# Patient Record
Sex: MALE | NOT HISPANIC OR LATINO
[De-identification: names, ages, dates, MRNs, and addresses within clinical notes are randomized per-mention and may not be internally consistent; named-entity substitution may affect disease eponyms.]

---

## 2019-12-06 ENCOUNTER — APPOINTMENT (OUTPATIENT)
Dept: UROLOGY | Facility: CLINIC | Age: 84
End: 2019-12-06
Payer: MEDICARE

## 2019-12-06 VITALS
SYSTOLIC BLOOD PRESSURE: 103 MMHG | TEMPERATURE: 98.1 F | DIASTOLIC BLOOD PRESSURE: 57 MMHG | HEART RATE: 60 BPM | HEIGHT: 67 IN | BODY MASS INDEX: 25.43 KG/M2 | RESPIRATION RATE: 15 BRPM | WEIGHT: 162 LBS

## 2019-12-06 DIAGNOSIS — Z80.3 FAMILY HISTORY OF MALIGNANT NEOPLASM OF BREAST: ICD-10-CM

## 2019-12-06 DIAGNOSIS — Z86.79 PERSONAL HISTORY OF OTHER DISEASES OF THE CIRCULATORY SYSTEM: ICD-10-CM

## 2019-12-06 DIAGNOSIS — Z72.89 OTHER PROBLEMS RELATED TO LIFESTYLE: ICD-10-CM

## 2019-12-06 PROCEDURE — 99203 OFFICE O/P NEW LOW 30 MIN: CPT

## 2019-12-06 NOTE — REVIEW OF SYSTEMS
[Seen by urologist before (Name)  ___] : Preciously seen by a urologist: [unfilled] [Poor quality erections] : Poor quality erections [Wake up at night to urinate  How many times?  ___] : wakes up to urinate [unfilled] times during the night [Negative] : Endocrine

## 2019-12-11 NOTE — PHYSICAL EXAM
[General Appearance - Well Nourished] : well nourished [General Appearance - Well Developed] : well developed [Normal Appearance] : normal appearance [Well Groomed] : well groomed [Edema] : no peripheral edema [General Appearance - In No Acute Distress] : no acute distress [Respiration, Rhythm And Depth] : normal respiratory rhythm and effort [Exaggerated Use Of Accessory Muscles For Inspiration] : no accessory muscle use [Abdomen Tenderness] : non-tender [Abdomen Soft] : soft [Costovertebral Angle Tenderness] : no ~M costovertebral angle tenderness [Urethral Meatus] : meatus normal [Penis Abnormality] : normal circumcised penis [Scrotum] : the scrotum was normal [Urinary Bladder Findings] : the bladder was normal on palpation [Testes Mass (___cm)] : there were no testicular masses [Rectal Exam - Rectum] : digital rectal exam was normal [No Prostate Nodules] : no prostate nodules [Prostate Size ___ (0-4)] : prostate size [unfilled] (scale: 0-4) [Normal Station and Gait] : the gait and station were normal for the patient's age [No Focal Deficits] : no focal deficits [] : no rash [Affect] : the affect was normal [Oriented To Time, Place, And Person] : oriented to person, place, and time [Mood] : the mood was normal [Not Anxious] : not anxious [No Palpable Adenopathy] : no palpable adenopathy [FreeTextEntry1] : right inguinal hernia

## 2019-12-11 NOTE — LETTER BODY
[Courtesy Letter:] : I had the pleasure of seeing your patient, [unfilled], in my office today. [Dear  ___] : Dear  [unfilled], [Please see my note below.] : Please see my note below. [Consult Closing:] : Thank you very much for allowing me to participate in the care of this patient.  If you have any questions, please do not hesitate to contact me. [FreeTextEntry1] : Thank you so much for referring . John Pryor to see me.\par \par As you know, he is an 85 yo male with rising psa after prostate cancer therapy.  Had elevated PSA some years ago, and had biopsy with rachel 7 and underwent XRT ~ 8 years ago ([facility] out on [place]).  Has been following, with now rising PSA level after zofia ~ 0.5 post XRT.  No current voiding symptoms.  Sexually active.  No flank, abdominal, musculoskeletal pain.  No hematuria.\par \par MRI done in 3/2019---[facility] 2.\par \par PSA Hx:\par 2.3--- 9/2019\par 2.1--- 2/2019\par 1.8--- 9/2018\par 1.3--- 7/2017\par 0.94-- 6/2016\par \par Current meds: ramipril, nifedipine, atorvastatin, metoprolol, asa 81 mg\par PMH: CAD, prostate cancer, possible sarcoidosis\par PSH: CABG 1987, hernia, ccy, appy, neural fibroma\par NKDA\par SH: , social ETOH\par FH: no sig  FH (though breast cancer in mother)\par \par His PE is unremarkable today.\par \par We discussed options including hormone rx, f/u with labs, given age and current PSA levels with evidence for biochemical recurrence post radiation. \par \par We spoke about early and delayed hormonal therapy.  Pt asymptomatic at present, prefers to RTC and will Check PSA in approx 3 months (which will be approximately 6 months following most recent psa).  He is not eager to rush into therapy at this time [Sincerely,] : Sincerely,

## 2019-12-11 NOTE — ASSESSMENT
[FreeTextEntry1] : We discussed options including hormone rx, f/u with labs, given age and current PSA levels with evidence for biochemical recurrence post radiation. \par \par We spoke about early and delayed hormonal therapy.  Pt asymptomatic at present, prefers to RTC and will Check PSA in approx 3 months (which will be approximately 6 months following most recent psa).  He is not eager to rush into therapy at this time, and in this I concur. \par \par

## 2019-12-11 NOTE — HISTORY OF PRESENT ILLNESS
[None] : no symptoms [FreeTextEntry1] : Pt is 85 yo male referred by Dr. Tahir Linares.  Had elevated PSA some years ago, and had biopsy with rachel 7 and underwent XRT ~ 8 years ago ([facility] out on [place]).  Has been following, with now rising PSA level after zofia ~ 0.5 post XRT.  No current voiding symptoms.  Sexually active.  No flank, abdominal, musculoskeletal pain.  No hematuria.\par \par MRI done in 3/2019---[facility]ADS 2.\par \par PSA Hx:\par 2.3--- 9/2019\par 2.1--- 2/2019\par 1.8--- 9/2018\par 1.3--- 7/2017\par 0.94-- 6/2016\par \par Current meds: ramipril, nifedipine, atorvastatin, metoprolol, asa 81 mg\par PMH: CAD, prostate cancer, possible sarcoidosis\par PSH: CABG 1987, hernia, ccy, appy, neural fibroma\par NKDA\par SH: , social ETOH\par FH: no sig  FH (though breast cancer in mother)

## 2020-02-07 ENCOUNTER — APPOINTMENT (OUTPATIENT)
Dept: UROLOGY | Facility: CLINIC | Age: 85
End: 2020-02-07
Payer: MEDICARE

## 2020-02-07 VITALS
HEIGHT: 67 IN | BODY MASS INDEX: 25.43 KG/M2 | SYSTOLIC BLOOD PRESSURE: 124 MMHG | TEMPERATURE: 98.1 F | HEART RATE: 46 BPM | DIASTOLIC BLOOD PRESSURE: 76 MMHG | WEIGHT: 162 LBS

## 2020-02-07 PROCEDURE — 99212 OFFICE O/P EST SF 10 MIN: CPT

## 2020-02-10 LAB — PSA SERPL-MCNC: 2.3 NG/ML

## 2020-02-14 NOTE — ASSESSMENT
[FreeTextEntry1] : PSA today; will review with pt, assess for any further psa change\par \par we spoke about observation, early androgen ablation, his age and overall risks\par \par Will adjust plan based on new PSA

## 2020-02-14 NOTE — PHYSICAL EXAM
[General Appearance - Well Nourished] : well nourished [Normal Appearance] : normal appearance [General Appearance - Well Developed] : well developed [Well Groomed] : well groomed [General Appearance - In No Acute Distress] : no acute distress [Edema] : no peripheral edema [] : no respiratory distress [Respiration, Rhythm And Depth] : normal respiratory rhythm and effort [Exaggerated Use Of Accessory Muscles For Inspiration] : no accessory muscle use [Oriented To Time, Place, And Person] : oriented to person, place, and time [Affect] : the affect was normal [Not Anxious] : not anxious [Mood] : the mood was normal [Normal Station and Gait] : the gait and station were normal for the patient's age [No Focal Deficits] : no focal deficits

## 2020-02-14 NOTE — HISTORY OF PRESENT ILLNESS
[None] : no symptoms [FreeTextEntry1] : Pt returns in 4 month f/u:\par \par Pt is 85 yo male referred by Dr. Tahir Linares. Had elevated PSA some years ago, and had biopsy with rachel 7 and underwent XRT ~ 8 years ago ([facility] out on [place]). Has been following, with now rising PSA level after zofia ~ 0.5 post XRT. No current voiding symptoms. Sexually active. No flank, abdominal, musculoskeletal pain. No hematuria.\par \par MRI done in 3/2019---[facility]ADS 2.\par \par PSA Hx:\par 2.3--- 9/2019\par 2.1--- 2/2019\par 1.8--- 9/2018\par 1.3--- 7/2017\par 0.94-- 6/2016\par

## 2020-07-15 ENCOUNTER — APPOINTMENT (OUTPATIENT)
Dept: UROLOGY | Facility: CLINIC | Age: 85
End: 2020-07-15
Payer: MEDICARE

## 2020-07-15 VITALS — TEMPERATURE: 97.6 F

## 2020-07-15 PROCEDURE — 99214 OFFICE O/P EST MOD 30 MIN: CPT

## 2020-07-15 NOTE — PHYSICAL EXAM
[General Appearance - Well Developed] : well developed [Well Groomed] : well groomed [Normal Appearance] : normal appearance [General Appearance - In No Acute Distress] : no acute distress [General Appearance - Well Nourished] : well nourished [Abdomen Tenderness] : non-tender [Abdomen Soft] : soft [Edema] : no peripheral edema [Costovertebral Angle Tenderness] : no ~M costovertebral angle tenderness [Respiration, Rhythm And Depth] : normal respiratory rhythm and effort [Exaggerated Use Of Accessory Muscles For Inspiration] : no accessory muscle use [Oriented To Time, Place, And Person] : oriented to person, place, and time [Affect] : the affect was normal [Mood] : the mood was normal [Not Anxious] : not anxious [Normal Station and Gait] : the gait and station were normal for the patient's age [No Focal Deficits] : no focal deficits [Penis Abnormality] : normal circumcised penis [Urethral Meatus] : meatus normal [Epididymis] : the epididymides were normal [Testes Mass (___cm)] : there were no testicular masses [Urinary Bladder Findings] : the bladder was normal on palpation [Scrotum] : the scrotum was normal [No Prostate Nodules] : no prostate nodules [Rectal Exam - Rectum] : digital rectal exam was normal [Prostate Tenderness] : the prostate was not tender [Prostate Size ___ (0-4)] : prostate size [unfilled] (scale: 0-4) [] : no rash [FreeTextEntry1] : right soft inguinal hernia

## 2020-07-15 NOTE — HISTORY OF PRESENT ILLNESS
[None] : no symptoms [FreeTextEntry1] : Pt returns in 4 month f/u:\par \par Pt is now 84 yo male referred by Dr. Tahir Linares. Had elevated PSA some years ago, and had biopsy with rachel 7 and underwent XRT ~ 8 years ago ([facility] out on [place]). Has been following, with now rising PSA level after zofia ~ 0.5 post XRT. No current voiding symptoms. Sexually active. No flank, abdominal, musculoskeletal pain. No hematuria.\par \par MRI done in 3/2019---[facility] 2.\par \par PSA Hx:\par 2.7---7/2020\par 2.3---2/2020\par 2.3--- 9/2019\par 2.1--- 2/2019\par 1.8--- 9/2018\par 1.3--- 7/2017\par 0.94-- 6/2016\par

## 2020-07-15 NOTE — LETTER BODY
[Dear  ___] : Dear  [unfilled], [Courtesy Letter:] : I had the pleasure of seeing your patient, [unfilled], in my office today. [Consult Closing:] : Thank you very much for allowing me to participate in the care of this patient.  If you have any questions, please do not hesitate to contact me. [Please see my note below.] : Please see my note below. [Sincerely,] : Sincerely, [FreeTextEntry1] : Pt returns in 4 month f/u:\par \par Pt is now 84 yo male referred by Dr. Tahir Linares. Had elevated PSA some years ago, and had biopsy with rachel 7 and underwent XRT ~ 8 years ago ([facility] out on [place]). Has been following, with now rising PSA level after zofia ~ 0.5 post XRT. No current voiding symptoms. Sexually active. No flank, abdominal, musculoskeletal pain. No hematuria.\par \par MRI done in 3/2019---[facility]ADS 2.\par \par PSA Hx:\par 2.7---7/2020\par 2.3---2/2020\par 2.3--- 9/2019\par 2.1--- 2/2019\par 1.8--- 9/2018\par 1.3--- 7/2017\par 0.94-- 6/2016\par \par PE unremarkable today. Prostate small, flat, no nodules or induration.\par \par \par PSA reviewed with pt, no sig change, though slightly higher.  Will observe.\par \par we spoke about observation, early androgen ablation, his age and overall risks\par \par Will adjust plan based on new PSA-- > would observe given age 85, psa change is not drastic, no sx, and main potential treatment of early hormonal therapy can be instituted at any time, though may cause sx.\par \par RTC 4 months with psa.

## 2020-07-15 NOTE — ASSESSMENT
[FreeTextEntry1] : PSA reviewed with pt, no sig change, though slightly higher.  Will observe.\par \par we spoke about observation, early androgen ablation, his age and overall risks\par \par Will adjust plan based on new PSA-- > would observe given age 85, psa change is not drastic, no sx, and main potential treatment of early hormonal therapy can be instituted at any time, though may cause sx.\par \par RTC 4 months with psa.

## 2020-11-18 ENCOUNTER — APPOINTMENT (OUTPATIENT)
Dept: UROLOGY | Facility: CLINIC | Age: 85
End: 2020-11-18
Payer: MEDICARE

## 2020-11-18 VITALS — TEMPERATURE: 96.5 F

## 2020-11-18 PROCEDURE — 99213 OFFICE O/P EST LOW 20 MIN: CPT

## 2020-11-18 NOTE — LETTER BODY
[Dear  ___] : Dear  [unfilled], [Courtesy Letter:] : I had the pleasure of seeing your patient, [unfilled], in my office today. [Please see my note below.] : Please see my note below. [Consult Closing:] : Thank you very much for allowing me to participate in the care of this patient.  If you have any questions, please do not hesitate to contact me. [Sincerely,] : Sincerely, [FreeTextEntry1] : Pt returns in 4 month f/u:\par \par Pt is now 86 yo male referred by Dr. Tahir Linares. Had elevated PSA some years ago, and had biopsy with rachel 7 and underwent XRT ~ 8 years ago ([facility] out on [place]). Has been following, with now rising PSA level after zofia ~ 0.5 post XRT. No current voiding symptoms. Sexually active. No flank, abdominal, musculoskeletal pain. No hematuria.\par \par MRI done in 3/2019---[facility]ADS 2.\par \par PSA Hx:\par 2.71-- 11/2020\par 2.7---7/2020\par 2.3---2/2020\par 2.3--- 9/2019\par 2.1--- 2/2019\par 1.8--- 9/2018\par 1.3--- 7/2017\par 0.94-- 6/2016\par \par PE unremarkable today. Prostate small, flat, no nodules or induration.\par \par \par PSA reviewed with pt, no sig change, though slightly higher.  Will observe.\par \par we spoke about observation, early androgen ablation, his age and overall risks\par \par Will adjust plan based on new PSA-- > would observe given age 85, psa change is not drastic, no sx, and main potential treatment of early hormonal therapy can be instituted at any time, though may cause sx.\par \par RTC 4 months with psa.

## 2020-11-18 NOTE — HISTORY OF PRESENT ILLNESS
[FreeTextEntry1] : Pt returns in 4 month f/u:\par \par Pt is now 86 yo male referred by Dr. Tahir Linares. Had elevated PSA some years ago, and had biopsy with rachel 7 and underwent XRT ~ 8 years ago ([facility] out on [place]). Has been following, with now rising PSA level after zofia ~ 0.5 post XRT. No current voiding symptoms. Sexually active. No flank, abdominal, musculoskeletal pain. No hematuria.\par \par MRI done in 3/2019---[facility]ADS 2.\par \par PSA Hx:\par 2.71-- 11/2020\par 2.7---7/2020\par 2.3---2/2020\par 2.3--- 9/2019\par 2.1--- 2/2019\par 1.8--- 9/2018\par 1.3--- 7/2017\par 0.94-- 6/2016 [None] : no symptoms

## 2020-11-18 NOTE — ASSESSMENT
[FreeTextEntry1] : PSA reviewed with pt, no sig change.  Will observe.\par \par Will adjust plan based on new PSA-- > would observe given age 85, psa change is not drastic, no sx, and main potential treatment of early hormonal therapy can be instituted at any time, though may cause sx.\par \par RTC 6 months with psa.

## 2020-11-18 NOTE — PHYSICAL EXAM
[General Appearance - Well Developed] : well developed [General Appearance - Well Nourished] : well nourished [Normal Appearance] : normal appearance [Well Groomed] : well groomed [General Appearance - In No Acute Distress] : no acute distress [Abdomen Soft] : soft [Abdomen Tenderness] : non-tender [Costovertebral Angle Tenderness] : no ~M costovertebral angle tenderness [FreeTextEntry1] : right soft inguinal hernia [Penis Abnormality] : normal circumcised penis [Urinary Bladder Findings] : the bladder was normal on palpation [Rectal Exam - Rectum] : digital rectal exam was normal [Prostate Tenderness] : the prostate was not tender [No Prostate Nodules] : no prostate nodules [Prostate Size ___ (0-4)] : prostate size [unfilled] (scale: 0-4) [Edema] : no peripheral edema [] : no respiratory distress [Respiration, Rhythm And Depth] : normal respiratory rhythm and effort [Exaggerated Use Of Accessory Muscles For Inspiration] : no accessory muscle use [Oriented To Time, Place, And Person] : oriented to person, place, and time [Affect] : the affect was normal [Mood] : the mood was normal [Not Anxious] : not anxious [Normal Station and Gait] : the gait and station were normal for the patient's age [No Focal Deficits] : no focal deficits

## 2021-05-05 ENCOUNTER — APPOINTMENT (OUTPATIENT)
Dept: UROLOGY | Facility: CLINIC | Age: 86
End: 2021-05-05
Payer: MEDICARE

## 2021-05-05 PROCEDURE — 99213 OFFICE O/P EST LOW 20 MIN: CPT

## 2021-05-06 NOTE — PHYSICAL EXAM
[General Appearance - Well Developed] : well developed [General Appearance - Well Nourished] : well nourished [Normal Appearance] : normal appearance [Well Groomed] : well groomed [General Appearance - In No Acute Distress] : no acute distress [Abdomen Tenderness] : non-tender [Abdomen Soft] : soft [Costovertebral Angle Tenderness] : no ~M costovertebral angle tenderness [Penis Abnormality] : normal circumcised penis [Urinary Bladder Findings] : the bladder was normal on palpation [Rectal Exam - Rectum] : digital rectal exam was normal [Prostate Tenderness] : the prostate was not tender [No Prostate Nodules] : no prostate nodules [Prostate Size ___ (0-4)] : prostate size [unfilled] (scale: 0-4) [Edema] : no peripheral edema [] : no respiratory distress [Respiration, Rhythm And Depth] : normal respiratory rhythm and effort [Exaggerated Use Of Accessory Muscles For Inspiration] : no accessory muscle use [Oriented To Time, Place, And Person] : oriented to person, place, and time [Affect] : the affect was normal [Mood] : the mood was normal [Not Anxious] : not anxious [Normal Station and Gait] : the gait and station were normal for the patient's age [No Focal Deficits] : no focal deficits [FreeTextEntry1] : no sig changes over interval

## 2021-05-06 NOTE — ASSESSMENT
[FreeTextEntry1] : PSA and exam appear stable over past 6 months; will cont to follow. Asymptomatic at this time, feeling well.\par \par Will adjust plan based on new PSA as we follow-- > would observe given age 85, psa change is not drastic, no sx, and main potential treatment of early hormonal therapy can be instituted at any time, though may cause sx.\par \par RTC 6 months with psa, exam

## 2021-05-06 NOTE — LETTER BODY
[Dear  ___] : Dear  [unfilled], [Courtesy Letter:] : I had the pleasure of seeing your patient, [unfilled], in my office today. [Please see my note below.] : Please see my note below. [Consult Closing:] : Thank you very much for allowing me to participate in the care of this patient.  If you have any questions, please do not hesitate to contact me. [Sincerely,] : Sincerely, [FreeTextEntry1] : Pt returns in 6 month f/u:\par \par Pt is now 84 yo male referred by Dr. Tahir Linares. Had elevated PSA some years ago, and had biopsy with rachel 7 and underwent XRT ~ 8-9 years ago ([facility] out on [place]). Has been following, with now rising PSA level after zofia ~ 0.5 post XRT. No current voiding symptoms. Sexually active. No flank, abdominal, musculoskeletal pain. No hematuria.\par \par MRI done in 3/2019---[facility]ADS 2.\par \par PSA Hx:\par 2.78- 4/29/21\par 2.71-- 11/2020\par 2.7---7/2020\par 2.3---2/2020\par 2.3--- 9/2019\par 2.1--- 2/2019\par 1.8--- 9/2018\par 1.3--- 7/2017\par 0.94-- 6/2016\par \par PSA and exam appear stable over past 6 months; will cont to follow. Asymptomatic at this time, feeling well.\par \par Will adjust plan based on new PSA as we follow-- > would observe given age 85, psa change is not drastic, no sx, and main potential treatment of early hormonal therapy can be instituted at any time, though may cause sx.\par \par RTC 6 months with psa, exam

## 2021-11-05 ENCOUNTER — APPOINTMENT (OUTPATIENT)
Dept: UROLOGY | Facility: CLINIC | Age: 86
End: 2021-11-05

## 2021-12-03 ENCOUNTER — APPOINTMENT (OUTPATIENT)
Dept: UROLOGY | Facility: CLINIC | Age: 86
End: 2021-12-03
Payer: MEDICARE

## 2021-12-03 PROCEDURE — 99214 OFFICE O/P EST MOD 30 MIN: CPT

## 2021-12-03 NOTE — ASSESSMENT
[FreeTextEntry1] : PSA and exam appear stable over past 6 months; will cont to follow. Asymptomatic at this time, feeling well.\par \par Will adjust plan based on new PSA as we follow-- > would observe given age 86, psa change is not drastic, no sx, and main potential treatment of early hormonal therapy can be instituted at any time, though may cause sx.\par \par Pt did some weeks ago at [place], per his report, but I don't yet have results- will call. Pt reports "it might be 3.9 now"\par \par Options regarding further observation vs. hormonal therapy reviewed at length, including all risks androgen blockade.\par \par RTC 6 months with psa, exam\par will also recheck PSA today for consistency in lab, and to recheck for any spurious rise.\par \par "[place] study 2018 about hormone therapy, prostate cancer specific mortality better on early ADT with long psa doubling time and psa < 12 compared with psa > 12- reviewed with pt and wife at length*

## 2021-12-03 NOTE — HISTORY OF PRESENT ILLNESS
[None] : no symptoms [FreeTextEntry1] : Pt returns in 6 month f/u:\par \par Pt is now 87 yo male referred by Dr. Tahir Linares. Had elevated PSA some years ago, and had biopsy with rachel 7 and underwent XRT ~ 8 years ago ([facility] out on [place]). Has been following, with now rising PSA level after zofia ~ 0.5 post XRT. No current voiding symptoms. Sexually active. No flank, abdominal, musculoskeletal pain. No hematuria.\par \par MRI done in 3/2019---[facility]ADS 2.\par \par PSA Hx:\par 2.78- 4/29/21\par 2.71-- 11/2020\par 2.7---7/2020\par 2.3---2/2020\par 2.3--- 9/2019\par 2.1--- 2/2019\par 1.8--- 9/2018\par 1.3--- 7/2017\par 0.94-- 6/2016

## 2021-12-03 NOTE — LETTER BODY
[Dear  ___] : Dear  [unfilled], [Courtesy Letter:] : I had the pleasure of seeing your patient, [unfilled], in my office today. [Please see my note below.] : Please see my note below. [Consult Closing:] : Thank you very much for allowing me to participate in the care of this patient.  If you have any questions, please do not hesitate to contact me. [Sincerely,] : Sincerely, [FreeTextEntry1] : Pt returns in 6 month f/u:\par \par Pt is now 87 yo male referred by Dr. Tahir Linares. Had elevated PSA some years ago, and had biopsy with rachel 7 and underwent XRT ~ 8 years ago ([facility] out on [place]). Has been following, with now rising PSA level after zofia ~ 0.5 post XRT. No current voiding symptoms. Sexually active. No flank, abdominal, musculoskeletal pain. No hematuria.\par \par MRI done in 3/2019---[facility]ADS 2.\par \par PSA Hx:\par 2.78- 4/29/21\par 2.71-- 11/2020\par 2.7---7/2020\par 2.3---2/2020\par 2.3--- 9/2019\par 2.1--- 2/2019\par 1.8--- 9/2018\par 1.3--- 7/2017\par 0.94-- 6/2016\par \par PE unremarkable for change.\par \par PSA and exam appear stable over past 6 months; will cont to follow. Asymptomatic at this time, feeling well.\par \par Will adjust plan based on new PSA as we follow-- > would observe given age 86, psa change is not drastic, no sx, and main potential treatment of early hormonal therapy can be instituted at any time, though may cause sx.\par \par Pt did some weeks ago at [place], per his report, but I don't yet have results- will call. Pt reports "it might be 3.9 now"\par \par Options regarding further observation vs. hormonal therapy reviewed at length, including all risks androgen blockade.\par \par RTC 6 months with psa, exam\par will also recheck PSA today for consistency in lab, and to recheck for any spurious rise.\par \par "[place] study 2018 about hormone therapy, prostate cancer specific mortality better on early ADT with long psa doubling time and psa < 12 compared with psa > 12- reviewed with pt and wife at length*

## 2021-12-03 NOTE — PHYSICAL EXAM
[General Appearance - Well Developed] : well developed [General Appearance - Well Nourished] : well nourished [Normal Appearance] : normal appearance [Well Groomed] : well groomed [General Appearance - In No Acute Distress] : no acute distress [Abdomen Soft] : soft [Abdomen Tenderness] : non-tender [Costovertebral Angle Tenderness] : no ~M costovertebral angle tenderness [Penis Abnormality] : normal circumcised penis [Urinary Bladder Findings] : the bladder was normal on palpation [Rectal Exam - Rectum] : digital rectal exam was normal [Prostate Tenderness] : the prostate was not tender [No Prostate Nodules] : no prostate nodules [Prostate Size ___ (0-4)] : prostate size [unfilled] (scale: 0-4) [Edema] : no peripheral edema [] : no respiratory distress [Respiration, Rhythm And Depth] : normal respiratory rhythm and effort [Exaggerated Use Of Accessory Muscles For Inspiration] : no accessory muscle use [Oriented To Time, Place, And Person] : oriented to person, place, and time [Affect] : the affect was normal [Mood] : the mood was normal [Not Anxious] : not anxious [Normal Station and Gait] : the gait and station were normal for the patient's age [No Focal Deficits] : no focal deficits [FreeTextEntry1] : no sig changes over interval

## 2021-12-07 LAB — PSA SERPL-MCNC: 3.47 NG/ML

## 2021-12-09 ENCOUNTER — APPOINTMENT (OUTPATIENT)
Dept: UROLOGY | Facility: CLINIC | Age: 86
End: 2021-12-09
Payer: MEDICARE

## 2021-12-09 PROCEDURE — 99441: CPT | Mod: 95

## 2021-12-09 NOTE — HISTORY OF PRESENT ILLNESS
[Home] : at home, [unfilled] , at the time of the visit. [Other Location: e.g. Home (Enter Location, City,State)___] : at [unfilled] [Verbal consent obtained from patient] : the patient, [unfilled] [FreeTextEntry1] : The patient-doctor relationship has been established in a face to face fashion via real time video/audio HIPAA compliant communication using telemedicine software. The patient's identity has been confirmed. The patient was previously emailed a copy of the telemedicine consent. They have had a chance to review and has now given verbal consent and has requested care to be assessed and treated via telemedicine. They understand there may be limitations in this process, and that they may need further followup care in the office and/or hospital settings.\par \par Verbal consent given on Dec  9 2021  4:00PM\par \par ANA CANADA is a 86 year M who presents for phone f/u of repeat psa Notes 12/3/21 reviewed- now 85 yo male referred by Dr. Tahir Linares. Had elevated PSA some years ago, and had biopsy with rachel 7 and underwent XRT ~ 8 years ago ([facility] out on [place]). Has been following, with now rising PSA level after zofia ~ 0.5 post XRT. No current voiding symptoms. Not sexually active at this time. No flank, abdominal, musculoskeletal pain. No hematuria.\par \par MRI done in 3/2019---PIRADS 2.\par \par PSA Hx:\par 3.47-- 12/3/21\par 3.91-- 11/16/21\par 2.78- 4/29/21\par 2.71-- 11/2020\par 2.7---7/2020\par 2.3---2/2020\par 2.3--- 9/2019\par 2.1--- 2/2019\par 1.8--- 9/2018\par 1.3--- 7/2017\par 0.94-- 6/2016 \par \par \par \par \par \par 12/09/2021 \par \par The patient denies fevers, chills, nausea and/or vomiting, and no unexplained weight loss.\par \par All pertinent parts of the patient PFSH (past medical, family, and social histories), laboratory, radiological studies and available physician notes were reviewed prior to starting the face-to-face portion of the telemedicine visit. Questionnaire results, where appropriate, were discussed with the patient.\par  [None] : no symptoms

## 2021-12-09 NOTE — ASSESSMENT
[FreeTextEntry1] : LIkely continued rise of psa\par \par Considering back over the past years, appears doubling time for him is approx 3 years, given 1.8 in 9/2018 and 3.47 now, 1.3 in 2017 and 2.7 in 2020.\par \par We spoke today about early vs. late hormonal therapy, risks of observing vs treatment\par \par Pt agrees to observe at this time\par RTC in 6 months with repeat exam and psa as planned.

## 2022-05-20 ENCOUNTER — APPOINTMENT (OUTPATIENT)
Dept: UROLOGY | Facility: CLINIC | Age: 87
End: 2022-05-20
Payer: MEDICARE

## 2022-05-20 DIAGNOSIS — Z00.00 ENCOUNTER FOR GENERAL ADULT MEDICAL EXAMINATION W/OUT ABNORMAL FINDINGS: ICD-10-CM

## 2022-05-20 PROCEDURE — 99214 OFFICE O/P EST MOD 30 MIN: CPT

## 2022-05-20 NOTE — HISTORY OF PRESENT ILLNESS
[FreeTextEntry1] :  86 year M who presents for phone f/u of repeat psa Notes 12/3/21 reviewed- now 85 yo male referred by Dr. Tahir Linares. Had elevated PSA some years ago, and had biopsy with rachel 7 and underwent XRT ~ 8 years ago ([facility] out on [place]). Has been following, with now rising PSA level after zofia ~ 0.5 post XRT. No current voiding symptoms. Not sexually active at this time. No flank, abdominal, musculoskeletal pain. No hematuria.\par \par MRI done in 3/2019---PIRADS 2.\par \par PSA Hx:\par 3.47-- 12/3/21\par 3.91-- 11/16/21\par 2.78- 4/29/21\par 2.71-- 11/2020\par 2.7---7/2020\par 2.3---2/2020\par 2.3--- 9/2019\par 2.1--- 2/2019\par 1.8--- 9/2018\par 1.3--- 7/2017\par 0.94-- 6/2016 \par \par \par \par  [None] : no symptoms [Dysuria] : no dysuria [Hematuria - Gross] : no gross hematuria

## 2022-05-20 NOTE — PHYSICAL EXAM
[General Appearance - Well Developed] : well developed [General Appearance - Well Nourished] : well nourished [Normal Appearance] : normal appearance [Well Groomed] : well groomed [General Appearance - In No Acute Distress] : no acute distress [Abdomen Soft] : soft [Abdomen Tenderness] : non-tender [Costovertebral Angle Tenderness] : no ~M costovertebral angle tenderness [FreeTextEntry1] : reducible right ing hernia, nontender [Urethral Meatus] : meatus normal [Penis Abnormality] : normal circumcised penis [Urinary Bladder Findings] : the bladder was normal on palpation [Scrotum] : the scrotum was normal [Testes Mass (___cm)] : there were no testicular masses [Rectal Exam - Rectum] : digital rectal exam was normal [No Prostate Nodules] : no prostate nodules [Prostate Size ___ (0-4)] : prostate size [unfilled] (scale: 0-4) [Edema] : no peripheral edema [] : no respiratory distress [Respiration, Rhythm And Depth] : normal respiratory rhythm and effort [Exaggerated Use Of Accessory Muscles For Inspiration] : no accessory muscle use [Oriented To Time, Place, And Person] : oriented to person, place, and time [Affect] : the affect was normal [Mood] : the mood was normal [Not Anxious] : not anxious [Normal Station and Gait] : the gait and station were normal for the patient's age [No Focal Deficits] : no focal deficits [No Palpable Adenopathy] : no palpable adenopathy

## 2022-05-20 NOTE — ASSESSMENT
[FreeTextEntry1] : LIkely continued rise of psa\par \par Considering back over the past years, appears doubling time for him is approx 3 years, given 1.8 in 9/2018 and 3.47 now, 1.3 in 2017 and 2.7 in 2020.\par \par We spoke today about early vs. late hormonal therapy, risks of observing vs treatment\par \par Will check psa today, compare, review by phone or telehealth\par RTC in 6 months with repeat exam and psa as planned.

## 2022-05-23 LAB — PSA SERPL-MCNC: 2.78 NG/ML

## 2022-06-14 ENCOUNTER — APPOINTMENT (OUTPATIENT)
Dept: PHYSICAL MEDICINE AND REHAB | Facility: CLINIC | Age: 87
End: 2022-06-14

## 2022-11-23 ENCOUNTER — APPOINTMENT (OUTPATIENT)
Dept: UROLOGY | Facility: CLINIC | Age: 87
End: 2022-11-23

## 2022-11-23 PROCEDURE — 99214 OFFICE O/P EST MOD 30 MIN: CPT

## 2022-11-23 RX ORDER — ROSUVASTATIN CALCIUM 10 MG/1
10 TABLET, FILM COATED ORAL
Qty: 90 | Refills: 0 | Status: ACTIVE | COMMUNITY
Start: 2022-11-01

## 2022-11-23 RX ORDER — NIFEDIPINE 30 MG/1
30 TABLET, FILM COATED, EXTENDED RELEASE ORAL
Qty: 90 | Refills: 0 | Status: ACTIVE | COMMUNITY
Start: 2022-11-01

## 2022-11-23 RX ORDER — METOPROLOL SUCCINATE 25 MG/1
25 TABLET, EXTENDED RELEASE ORAL
Qty: 90 | Refills: 0 | Status: ACTIVE | COMMUNITY
Start: 2022-11-01

## 2022-11-23 NOTE — ASSESSMENT
[FreeTextEntry1] : No clinical changes.\par Unsure if recent labs done- will check\par \par plan \par 1) PSA today - will review\par 2) RTC 6 months

## 2022-11-23 NOTE — HISTORY OF PRESENT ILLNESS
[None] : None [FreeTextEntry1] : Now 87 year M who presents for phone f/u of repeat psa Notes 12/3/21 reviewed- init referred by Dr. Tahir Linares. Had elevated PSA some years ago, and had biopsy with rachel 7 and underwent XRT ~ 9 years ago ([facility] out on [place]). Has been following, with now rising PSA level after zofia ~ 0.5 post XRT. No current voiding symptoms. Not sexually active at this time. No flank, abdominal, musculoskeletal pain. No hematuria.\par \par MRI done in 3/2019---PIRADS 2.\par \par PSA Hx:\par 2.78-- 5/2022\par 3.47-- 12/3/21\par 3.91-- 11/16/21\par 2.78- 4/29/21\par 2.71-- 11/2020\par 2.7---7/2020\par 2.3---2/2020\par 2.3--- 9/2019\par 2.1--- 2/2019\par 1.8--- 9/2018\par 1.3--- 7/2017\par 0.94-- 6/2016 \par

## 2022-11-24 LAB
PSA SERPL-MCNC: 4.08 NG/ML
PSA SERPL-MCNC: 4.12 NG/ML

## 2022-11-28 ENCOUNTER — NON-APPOINTMENT (OUTPATIENT)
Age: 87
End: 2022-11-28

## 2022-12-16 ENCOUNTER — APPOINTMENT (OUTPATIENT)
Dept: UROLOGY | Facility: CLINIC | Age: 87
End: 2022-12-16

## 2023-05-17 ENCOUNTER — APPOINTMENT (OUTPATIENT)
Dept: UROLOGY | Facility: CLINIC | Age: 88
End: 2023-05-17
Payer: MEDICARE

## 2023-05-17 PROCEDURE — 99214 OFFICE O/P EST MOD 30 MIN: CPT

## 2023-05-17 NOTE — ASSESSMENT
[FreeTextEntry1] : No clinical changes.\par Pt feeling well and denies voiding issues at this time. Due for f/u with psa\par \par Discussed Hormone therapy- risks/benefits, early vs. late, given age, psa slowly increasing since radiation therapy\par \par plan \par 1) PSA today - will review by phone.\par 2) RTC 6 months

## 2023-05-17 NOTE — HISTORY OF PRESENT ILLNESS
[None] : None [FreeTextEntry1] : Now 87 year M who presents for phone f/u of repeat psa Notes 12/3/21 reviewed- init referred by Dr. Tahir Linares. Had elevated PSA some years ago, and had biopsy with rachel 7 and underwent XRT ~ 9 years ago ([facility] out on [place]). Has been following, with now rising PSA level after zofia ~ 0.5 post XRT. No current voiding symptoms. Not sexually active at this time. No flank, abdominal, musculoskeletal pain. No hematuria.\par \par MRI done in 3/2019---PIRADS 2.\par \par PSA Hx:\par 4.08 -- 11/2022\par 2.78-- 5/2022\par 3.47-- 12/3/21\par 3.91-- 11/16/21\par 2.78- 4/29/21\par 2.71-- 11/2020\par 2.7---7/2020\par 2.3---2/2020\par 2.3--- 9/2019\par 2.1--- 2/2019\par 1.8--- 9/2018\par 1.3--- 7/2017\par 0.94-- 6/2016 \par

## 2023-05-17 NOTE — PHYSICAL EXAM
[General Appearance - Well Developed] : well developed [Edema] : no peripheral edema [] : no respiratory distress [Oriented To Time, Place, And Person] : oriented to person, place, and time [Normal Station and Gait] : the gait and station were normal for the patient's age [No Focal Deficits] : no focal deficits [Abdomen Soft] : soft [FreeTextEntry1] : deferred today, after discussion with pt

## 2023-05-19 LAB — PSA SERPL-MCNC: 4.69 NG/ML

## 2023-05-23 ENCOUNTER — APPOINTMENT (OUTPATIENT)
Dept: UROLOGY | Facility: CLINIC | Age: 88
End: 2023-05-23
Payer: MEDICARE

## 2023-05-23 PROCEDURE — 99442: CPT | Mod: 95

## 2023-05-23 NOTE — HISTORY OF PRESENT ILLNESS
[Other Location: e.g. School (Enter Location, City,State)___] : at [unfilled], at the time of the visit. [Other Location: e.g. Home (Enter Location, City,State)___] : at [unfilled] [Verbal consent obtained from patient] : the patient, [unfilled] [FreeTextEntry1] : The patient-doctor relationship has been established in a face to face fashion via real time video/audio HIPAA compliant communication using telemedicine software. The patient's identity has been confirmed. The patient was previously emailed a copy of the telemedicine consent. They have had a chance to review and has now given verbal consent and has requested care to be assessed and treated via telemedicine. They understand there may be limitations in this process, and that they may need further followup care in the office and/or hospital settings.\par \par Verbal consent given on May 23 2023  2:00PM\par \par ANA CANADA is a 87 year M who presents for f/u of repeat psa Notes 12/3/21 reviewed- init referred by Dr. Tahir Linares. Had elevated PSA some years ago, and had biopsy with rachel 7 and underwent XRT ~ 9 years ago ([facility] out on [place]). Has been following, with now rising PSA level after zofia ~ 0.5 post XRT. No current voiding symptoms. Not sexually active at this time. No flank, abdominal, musculoskeletal pain. No hematuria.\par \par MRI done in 3/2019---PIRADS 2.\par Had f/u psa in office last week:\par \par PSA Hx:\par 4.69-- 5/17/23\par 4.08 -- 11/2022\par 2.78-- 5/2022\par 3.47-- 12/3/21\par 3.91-- 11/16/21\par 2.78- 4/29/21\par 2.71-- 11/2020\par 2.7---7/2020\par 2.3---2/2020\par 2.3--- 9/2019\par 2.1--- 2/2019\par 1.8--- 9/2018\par 1.3--- 7/2017\par 0.94-- 6/2016 \par \par \par \par \par 05/23/2023 \par \par The patient denies fevers, chills, nausea and/or vomiting, and no unexplained weight loss.\par \par All pertinent parts of the patient PFSH (past medical, family, and social histories), laboratory, radiological studies and available physician notes were reviewed prior to starting the face-to-face portion of the telemedicine visit. Questionnaire results, where appropriate, were discussed with the patient.\par

## 2023-05-23 NOTE — ASSESSMENT
[FreeTextEntry1] : Doubling time for the psa, and hence prostate cancer post xrt, appears to be approx 3 years.\par \par At that level, pt's age, asymptomatic now, I still recommend observation and surveillance rather than early hormonal therapy which is not without risks (bone health/fracture risk among them).\par \par We had fruitful discussion about starting therapy or continuing to observe- will have pt return in 6 months as planned for next eval unless earlier issues arise.

## 2023-11-10 ENCOUNTER — APPOINTMENT (OUTPATIENT)
Dept: UROLOGY | Facility: CLINIC | Age: 88
End: 2023-11-10
Payer: MEDICARE

## 2023-11-10 VITALS
TEMPERATURE: 97.3 F | DIASTOLIC BLOOD PRESSURE: 69 MMHG | HEART RATE: 59 BPM | RESPIRATION RATE: 17 BRPM | WEIGHT: 162 LBS | BODY MASS INDEX: 25.43 KG/M2 | HEIGHT: 67 IN | SYSTOLIC BLOOD PRESSURE: 133 MMHG

## 2023-11-10 PROCEDURE — 99213 OFFICE O/P EST LOW 20 MIN: CPT

## 2023-11-14 ENCOUNTER — APPOINTMENT (OUTPATIENT)
Dept: UROLOGY | Facility: CLINIC | Age: 88
End: 2023-11-14
Payer: MEDICARE

## 2023-11-14 LAB — PSA SERPL-MCNC: 6.35 NG/ML

## 2023-11-14 PROCEDURE — 99441: CPT | Mod: 95

## 2023-11-19 ENCOUNTER — OUTPATIENT (OUTPATIENT)
Dept: OUTPATIENT SERVICES | Facility: HOSPITAL | Age: 88
LOS: 1 days | Discharge: HOME | End: 2023-11-19

## 2023-11-19 DIAGNOSIS — C61 MALIGNANT NEOPLASM OF PROSTATE: ICD-10-CM

## 2023-11-22 ENCOUNTER — APPOINTMENT (OUTPATIENT)
Dept: HEMATOLOGY ONCOLOGY | Facility: CLINIC | Age: 88
End: 2023-11-22
Payer: MEDICARE

## 2023-11-22 VITALS
TEMPERATURE: 97.4 F | OXYGEN SATURATION: 97 % | SYSTOLIC BLOOD PRESSURE: 129 MMHG | HEART RATE: 56 BPM | DIASTOLIC BLOOD PRESSURE: 66 MMHG | WEIGHT: 164 LBS | HEIGHT: 65.35 IN | RESPIRATION RATE: 16 BRPM | BODY MASS INDEX: 27 KG/M2

## 2023-11-22 DIAGNOSIS — D86.0 SARCOIDOSIS OF LUNG: ICD-10-CM

## 2023-11-22 PROCEDURE — 99205 OFFICE O/P NEW HI 60 MIN: CPT

## 2023-11-22 RX ORDER — RAMIPRIL 5 MG/1
5 CAPSULE ORAL
Refills: 0 | Status: ACTIVE | COMMUNITY

## 2023-12-11 ENCOUNTER — APPOINTMENT (OUTPATIENT)
Dept: HEMATOLOGY ONCOLOGY | Facility: CLINIC | Age: 88
End: 2023-12-11
Payer: MEDICARE

## 2023-12-11 VITALS
TEMPERATURE: 98 F | SYSTOLIC BLOOD PRESSURE: 116 MMHG | WEIGHT: 166.23 LBS | RESPIRATION RATE: 16 BRPM | DIASTOLIC BLOOD PRESSURE: 64 MMHG | OXYGEN SATURATION: 96 % | BODY MASS INDEX: 27.36 KG/M2 | HEART RATE: 57 BPM

## 2023-12-11 PROCEDURE — 99214 OFFICE O/P EST MOD 30 MIN: CPT

## 2023-12-11 RX ORDER — BICALUTAMIDE 50 MG/1
50 TABLET ORAL DAILY
Qty: 90 | Refills: 3 | Status: ACTIVE | COMMUNITY
Start: 2023-12-11 | End: 1900-01-01

## 2023-12-13 NOTE — ASSESSMENT
[FreeTextEntry1] : John Latham is an 88 years old male who was diagnosed rachel 7 in one of 12 cores s/p 50 fractions of radiation at [facility] around 2012. His PSA has been rising to 6.75 in 11/2023 from 0.94 in 6/2016, 2.71 in 11/2020, and 4.69 in 5/2023.   Reviewed his PSMA PET CT in detail. 12/5/23 PSMA PET CT showed increased focal activity noted in the posterior lateral area of the right prostate with SUV 6 and another tiny activity is noted in the left apex with SUV 3.9, the right iliac bone showed a large lucency with low level tracer uptake with SUV 1.5. not any other lesions in the chest, abdomen and pelvis and bones. His right iliac bone showed low activity. It is nonspecific. I will treat him with only ADT with low volume, He was discussed with the benefits and risks of ADT. He will require calcium and vitamin D. Based on the DEXA scan, he will need prolia.  Plan start bicalutamide 50mg daily today start lupron inj in two weeks -will need prolia treatment based on his DEXA scan  start vit D and calcium  RTC in 3 months

## 2023-12-13 NOTE — HISTORY OF PRESENT ILLNESS
[de-identified] : John Latham is an 88 years old male with CABG in the past His PSA was trending up from below 1 to 3.0ng/ml. had a biopsy with rachel 7, one of 12 cores positive,  and underwent 50 fractions XRT around 2012 ([facility] at [place]). Has been following, with now rising PSA level after zofia ~ 0.5 post XRT.   PSA Hx: 6.75- 11/10/23 4.69-- 5/17/23 4.08 -- 11/2022 2.78-- 5/2022 3.47-- 12/3/21 3.91-- 11/16/21 2.78- 4/29/21 2.71-- 11/2020 2.7---7/2020 2.3---2/2020 2.3--- 9/2019 2.1--- 2/2019 1.8--- 9/2018 1.3--- 7/2017 0.94-- 6/2016  Reports feeling great. Urine flow is normal. Nocturia 1. Denies hesitancy, urgency, and gross hematuria.  [de-identified] : prostate cancer  [de-identified] : 12/5/23 PSMA PET CT showed increased focal activity noted in the posterior lateral area of the right prostate with SUV 6 and another tiny activity is noted in the left apex with SUV 3.9, the right iliac bone showed a large lucency with low level tracer uptake with SUV 1.5. not any other lesions in the chest, abdomen and pelvis and bones.   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

## 2023-12-26 ENCOUNTER — APPOINTMENT (OUTPATIENT)
Dept: INFUSION THERAPY | Facility: HOSPITAL | Age: 88
End: 2023-12-26

## 2023-12-27 DIAGNOSIS — Z51.11 ENCOUNTER FOR ANTINEOPLASTIC CHEMOTHERAPY: ICD-10-CM

## 2024-03-12 ENCOUNTER — OUTPATIENT (OUTPATIENT)
Dept: OUTPATIENT SERVICES | Facility: HOSPITAL | Age: 89
LOS: 1 days | Discharge: HOME | End: 2024-03-12

## 2024-03-12 DIAGNOSIS — C61 MALIGNANT NEOPLASM OF PROSTATE: ICD-10-CM

## 2024-03-22 ENCOUNTER — RESULT REVIEW (OUTPATIENT)
Age: 89
End: 2024-03-22

## 2024-03-22 ENCOUNTER — APPOINTMENT (OUTPATIENT)
Dept: INFUSION THERAPY | Facility: HOSPITAL | Age: 89
End: 2024-03-22

## 2024-03-22 ENCOUNTER — APPOINTMENT (OUTPATIENT)
Dept: HEMATOLOGY ONCOLOGY | Facility: CLINIC | Age: 89
End: 2024-03-22
Payer: MEDICARE

## 2024-03-22 VITALS
HEART RATE: 55 BPM | WEIGHT: 166.45 LBS | SYSTOLIC BLOOD PRESSURE: 114 MMHG | DIASTOLIC BLOOD PRESSURE: 64 MMHG | OXYGEN SATURATION: 99 % | TEMPERATURE: 97.3 F | RESPIRATION RATE: 16 BRPM | BODY MASS INDEX: 27.4 KG/M2

## 2024-03-22 LAB
BASOPHILS # BLD AUTO: 0.02 K/UL — SIGNIFICANT CHANGE UP (ref 0–0.2)
BASOPHILS NFR BLD AUTO: 0.2 % — SIGNIFICANT CHANGE UP (ref 0–2)
EOSINOPHIL # BLD AUTO: 0.27 K/UL — SIGNIFICANT CHANGE UP (ref 0–0.5)
EOSINOPHIL NFR BLD AUTO: 3.3 % — SIGNIFICANT CHANGE UP (ref 0–6)
HCT VFR BLD CALC: 36.4 % — LOW (ref 39–50)
HGB BLD-MCNC: 12.7 G/DL — LOW (ref 13–17)
IMM GRANULOCYTES NFR BLD AUTO: 0.1 % — SIGNIFICANT CHANGE UP (ref 0–0.9)
LYMPHOCYTES # BLD AUTO: 1.44 K/UL — SIGNIFICANT CHANGE UP (ref 1–3.3)
LYMPHOCYTES # BLD AUTO: 17.6 % — SIGNIFICANT CHANGE UP (ref 13–44)
MCHC RBC-ENTMCNC: 31.1 PG — SIGNIFICANT CHANGE UP (ref 27–34)
MCHC RBC-ENTMCNC: 34.9 G/DL — SIGNIFICANT CHANGE UP (ref 32–36)
MCV RBC AUTO: 89.2 FL — SIGNIFICANT CHANGE UP (ref 80–100)
MONOCYTES # BLD AUTO: 0.68 K/UL — SIGNIFICANT CHANGE UP (ref 0–0.9)
MONOCYTES NFR BLD AUTO: 8.3 % — SIGNIFICANT CHANGE UP (ref 2–14)
NEUTROPHILS # BLD AUTO: 5.76 K/UL — SIGNIFICANT CHANGE UP (ref 1.8–7.4)
NEUTROPHILS NFR BLD AUTO: 70.5 % — SIGNIFICANT CHANGE UP (ref 43–77)
NRBC # BLD: 0 /100 WBCS — SIGNIFICANT CHANGE UP (ref 0–0)
PLATELET # BLD AUTO: 186 K/UL — SIGNIFICANT CHANGE UP (ref 150–400)
RBC # BLD: 4.08 M/UL — LOW (ref 4.2–5.8)
RBC # FLD: 12.4 % — SIGNIFICANT CHANGE UP (ref 10.3–14.5)
WBC # BLD: 8.18 K/UL — SIGNIFICANT CHANGE UP (ref 3.8–10.5)
WBC # FLD AUTO: 8.18 K/UL — SIGNIFICANT CHANGE UP (ref 3.8–10.5)

## 2024-03-22 PROCEDURE — 99213 OFFICE O/P EST LOW 20 MIN: CPT

## 2024-03-23 NOTE — HISTORY OF PRESENT ILLNESS
[Disease: _____________________] : Disease: [unfilled] [de-identified] : John Latham is an 88 years old male with PMHx of CAD s/p CABG and prostate cancer. He had the initial medical oncology consultation on 11/22/23:  His PSA was trending up from below 1 to 3.0ng/ml. had a biopsy with rachel 7, one of 12 cores positive, and underwent 50 fractions XRT around 2012 ([facility] at [place]). Has been following, with now rising PSA level after zofia ~ 0.5 post XRT.  PSA Hx: 6.75- 11/10/23 4.69-- 5/17/23 4.08 -- 11/2022 2.78-- 5/2022 3.47-- 12/3/21 3.91-- 11/16/21 2.78- 4/29/21 2.71-- 11/2020 2.7---7/2020 2.3---2/2020 2.3--- 9/2019 2.1--- 2/2019 1.8--- 9/2018 1.3--- 7/2017 0.94-- 6/2016  Reports feeling great. Urine flow is normal. Nocturia 1. Denies hesitancy, urgency, and gross hematuria.      Interval History:  12/5/23 PSMA PET CT showed increased focal activity noted in the posterior lateral area of the right prostate with SUV 6 and another tiny activity is noted in the left apex with SUV 3.9, the right iliac bone showed a large lucency with low level tracer uptake with SUV 1.5. not any other lesions in the chest, abdomen and pelvis and bones.  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   [de-identified] : 3/22/24: he feels well and denies fatigue, hot flashes, changes with urination.   [de-identified] : prostate adenocarcinoma

## 2024-03-23 NOTE — ASSESSMENT
[FreeTextEntry1] : Prostate cancer (185) (C61) 88 years old male who was diagnosed rachel 7 in one of 12 cores s/p 50 fractions of radiation at [facility] around 2012. His PSA has been rising to 6.75 in 11/2023 from 0.94 in 6/2016, 2.71 in 11/2020, and 4.69 in 5/2023.  During the consultation in 12/2023, reviewed his PSMA PET CT in detail. 12/5/23 PSMA PET CT showed increased focal activity noted in the posterior lateral area of the right prostate with SUV 6 and another tiny activity is noted in the left apex with SUV 3.9, the right iliac bone showed a large lucency with low level tracer uptake with SUV 1.5. not any other lesions in the chest, abdomen and pelvis and bones. His right iliac bone showed low activity. It is nonspecific. I will treat him with only ADT with low volume, He was discussed with the benefits and risks of ADT. He will require calcium and vitamin D. Based on the DEXA scan, he will need prolia.  Plan -started bicalutamide 50mg daily around 12/11/23; started lupron inj (once every 3 months) on 12/26/23. Will receive today 3/22/24. Next dose around June 2024.  -f/u with lab today 3/22/24 including PSA and testosterone level.  -will need prolia treatment once every 6 months based on his DEXA scan 12/7/23 showing osteoporosis (right femoral neck T score -2.5). He will have dental clearance before starting prolia.  -continue vit D and calcium  RTC in 3 months.

## 2024-03-24 LAB
ALBUMIN SERPL ELPH-MCNC: 4.2 G/DL
ALP BLD-CCNC: 54 U/L
ALT SERPL-CCNC: 25 U/L
ANION GAP SERPL CALC-SCNC: 11 MMOL/L
AST SERPL-CCNC: 21 U/L
BILIRUB SERPL-MCNC: 0.5 MG/DL
BUN SERPL-MCNC: 24 MG/DL
CALCIUM SERPL-MCNC: 9.8 MG/DL
CHLORIDE SERPL-SCNC: 107 MMOL/L
CO2 SERPL-SCNC: 25 MMOL/L
CREAT SERPL-MCNC: 1.02 MG/DL
EGFR: 72 ML/MIN/1.73M2
GLUCOSE SERPL-MCNC: 112 MG/DL
POTASSIUM SERPL-SCNC: 5.6 MMOL/L
PROT SERPL-MCNC: 6.8 G/DL
PSA SERPL-MCNC: 0.09 NG/ML
SODIUM SERPL-SCNC: 142 MMOL/L
TESTOST SERPL-MCNC: <2.5 NG/DL

## 2024-03-25 ENCOUNTER — NON-APPOINTMENT (OUTPATIENT)
Age: 89
End: 2024-03-25

## 2024-03-25 DIAGNOSIS — Z51.11 ENCOUNTER FOR ANTINEOPLASTIC CHEMOTHERAPY: ICD-10-CM

## 2024-04-08 ENCOUNTER — NON-APPOINTMENT (OUTPATIENT)
Age: 89
End: 2024-04-08

## 2024-04-17 ENCOUNTER — APPOINTMENT (OUTPATIENT)
Dept: UROLOGY | Facility: CLINIC | Age: 89
End: 2024-04-17
Payer: MEDICARE

## 2024-04-17 VITALS — DIASTOLIC BLOOD PRESSURE: 80 MMHG | SYSTOLIC BLOOD PRESSURE: 120 MMHG | HEART RATE: 60 BPM

## 2024-04-17 PROCEDURE — 99213 OFFICE O/P EST LOW 20 MIN: CPT

## 2024-04-22 NOTE — PHYSICAL EXAM
[Normal Appearance] : normal appearance [Well Groomed] : well groomed [General Appearance - In No Acute Distress] : no acute distress [Edema] : no peripheral edema [Respiration, Rhythm And Depth] : normal respiratory rhythm and effort [Exaggerated Use Of Accessory Muscles For Inspiration] : no accessory muscle use [Abdomen Tenderness] : non-tender [Normal Station and Gait] : the gait and station were normal for the patient's age [] : no rash [No Focal Deficits] : no focal deficits [Oriented To Time, Place, And Person] : oriented to person, place, and time [Affect] : the affect was normal [Mood] : the mood was normal

## 2024-04-22 NOTE — HISTORY OF PRESENT ILLNESS
[FreeTextEntry1] : 88 year M who presents for f/u psa. Pt is now being treated for prostate ca by Dr. Weiner. Pt is receiving Lupron and bicalutamide 50 mg. Pt complaining of bilateral leg swelling. He saw cardiology and had an echo.   Not sexually active at this time. No flank, abdominal, musculoskeletal pain. No hematuria.  HX init referred by Dr. Tahir Linares. Had elevated PSA some years ago, and had biopsy with rachel 7 and underwent XRT ~ 11 years ago ([facility] out on [place]). Has been following, with now rising PSA level after zofia ~ 0.5 post XRT. No current voiding symptoms.   MRI done in 3/2019---PIRADS 2.  PSA Hx: 0.09- 03/2024 6.75- 11/10/23 4.69-- 5/17/23 4.08 -- 11/2022 2.78-- 5/2022 3.47-- 12/3/21 3.91-- 11/16/21 2.78- 4/29/21 2.71-- 11/2020 2.7---7/2020 2.3---2/2020 2.3--- 9/2019 2.1--- 2/2019 1.8--- 9/2018 1.3--- 7/2017 0.94-- 6/2016  [Currently Experiencing ___] :  [unfilled]

## 2024-04-22 NOTE — ASSESSMENT
[FreeTextEntry1] : d/w pt- of excellent performance status  Follow up with cardiologist for leg swelling.   PSA is responding well and tolerating Lupron/ Bicalutamide   plan  1) Follow up in 6 months and will switch to 6 month juanis at that time Dr. Weiner aware of plans= he will admin lupron 3 months in approx june 2024

## 2024-06-14 ENCOUNTER — RESULT REVIEW (OUTPATIENT)
Age: 89
End: 2024-06-14

## 2024-06-14 ENCOUNTER — APPOINTMENT (OUTPATIENT)
Dept: HEMATOLOGY ONCOLOGY | Facility: CLINIC | Age: 89
End: 2024-06-14
Payer: MEDICARE

## 2024-06-14 ENCOUNTER — APPOINTMENT (OUTPATIENT)
Dept: INFUSION THERAPY | Facility: HOSPITAL | Age: 89
End: 2024-06-14

## 2024-06-14 VITALS
OXYGEN SATURATION: 97 % | HEART RATE: 60 BPM | HEIGHT: 66.14 IN | TEMPERATURE: 97.9 F | SYSTOLIC BLOOD PRESSURE: 107 MMHG | BODY MASS INDEX: 26.82 KG/M2 | DIASTOLIC BLOOD PRESSURE: 67 MMHG | RESPIRATION RATE: 16 BRPM | WEIGHT: 166.89 LBS

## 2024-06-14 DIAGNOSIS — C61 MALIGNANT NEOPLASM OF PROSTATE: ICD-10-CM

## 2024-06-14 PROCEDURE — 99213 OFFICE O/P EST LOW 20 MIN: CPT

## 2024-06-14 PROCEDURE — G2211 COMPLEX E/M VISIT ADD ON: CPT

## 2024-06-14 NOTE — HISTORY OF PRESENT ILLNESS
[Disease: _____________________] : Disease: [unfilled] [de-identified] : John Latham is an 88 years old male with PMHx of CAD s/p CABG and prostate cancer. He had the initial medical oncology consultation on 11/22/23:  His PSA was trending up from below 1 to 3.0ng/ml. had a biopsy with rachel 7, one of 12 cores positive, and underwent 50 fractions XRT around 2012 ([facility] at [place]). Has been following, with now rising PSA level after zofia ~ 0.5 post XRT.  PSA Hx: 6.75- 11/10/23 4.69-- 5/17/23 4.08 -- 11/2022 2.78-- 5/2022 3.47-- 12/3/21 3.91-- 11/16/21 2.78- 4/29/21 2.71-- 11/2020 2.7---7/2020 2.3---2/2020 2.3--- 9/2019 2.1--- 2/2019 1.8--- 9/2018 1.3--- 7/2017 0.94-- 6/2016  Reports feeling great. Urine flow is normal. Nocturia 1. Denies hesitancy, urgency, and gross hematuria.      Interval History:  12/5/23 PSMA PET CT showed increased focal activity noted in the posterior lateral area of the right prostate with SUV 6 and another tiny activity is noted in the left apex with SUV 3.9, the right iliac bone showed a large lucency with low level tracer uptake with SUV 1.5. not any other lesions in the chest, abdomen and pelvis and bones.  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   [de-identified] : prostate adenocarcinoma  [de-identified] : 3/22/24: he feels well and denies fatigue, hot flashes, changes with urination.   6/14/24 feels overall fine, denies fatigue, hot flashes, and sweating. Nocturia 1.

## 2024-06-14 NOTE — ASSESSMENT
[FreeTextEntry1] : Prostate cancer (185) (C61) 88 years old male who was diagnosed rachel 7 in one of 12 cores s/p 50 fractions of radiation at [facility] around 2012. His PSA has been rising to 6.75 in 11/2023 from 0.94 in 6/2016, 2.71 in 11/2020, and 4.69 in 5/2023.  During the consultation in 12/2023, reviewed his PSMA PET CT in detail. 12/5/23 PSMA PET CT showed increased focal activity noted in the posterior lateral area of the right prostate with SUV 6 and another tiny activity is noted in the left apex with SUV 3.9, the right iliac bone showed a large lucency with low level tracer uptake with SUV 1.5. not any other lesions in the chest, abdomen and pelvis and bones. His right iliac bone showed low activity. It is nonspecific. I will treat him with only ADT with low volume, He was discussed with the benefits and risks of ADT. He will require calcium and vitamin D. Based on the DEXA scan with osteoporosis, starts prolia on June 14, 2024   Plan -started bicalutamide 50mg daily around 12/11/23; started lupron inj (once every 3 months) on 12/26/23. Will receive today 3/22/24. Next dose around June 2024.  -f/u with lab today 3/22/24 including PSA and testosterone level.  -start prolia treatment once every 6 months on June 14, 2024  based on his DEXA scan 12/7/23 showing osteoporosis (right femoral neck T score -2.5) -continue vit D and calcium  RTC in 3 months.

## 2024-06-16 LAB
ALBUMIN SERPL ELPH-MCNC: 4.2 G/DL
ALP BLD-CCNC: 60 U/L
ALT SERPL-CCNC: 17 U/L
ANION GAP SERPL CALC-SCNC: 14 MMOL/L
AST SERPL-CCNC: 20 U/L
BILIRUB SERPL-MCNC: 0.3 MG/DL
BUN SERPL-MCNC: 22 MG/DL
CALCIUM SERPL-MCNC: 9.5 MG/DL
CHLORIDE SERPL-SCNC: 104 MMOL/L
CO2 SERPL-SCNC: 22 MMOL/L
CREAT SERPL-MCNC: 1.05 MG/DL
EGFR: 69 ML/MIN/1.73M2
GLUCOSE SERPL-MCNC: 114 MG/DL
POTASSIUM SERPL-SCNC: 4.4 MMOL/L
PROT SERPL-MCNC: 6.9 G/DL
PSA SERPL-MCNC: <0.01 NG/ML
SODIUM SERPL-SCNC: 140 MMOL/L

## 2024-06-17 ENCOUNTER — NON-APPOINTMENT (OUTPATIENT)
Age: 89
End: 2024-06-17

## 2024-09-01 ENCOUNTER — OUTPATIENT (OUTPATIENT)
Dept: OUTPATIENT SERVICES | Facility: HOSPITAL | Age: 89
LOS: 1 days | Discharge: HOME | End: 2024-09-01

## 2024-09-01 DIAGNOSIS — C61 MALIGNANT NEOPLASM OF PROSTATE: ICD-10-CM

## 2024-09-13 ENCOUNTER — RESULT REVIEW (OUTPATIENT)
Age: 89
End: 2024-09-13

## 2024-09-13 ENCOUNTER — APPOINTMENT (OUTPATIENT)
Dept: INFUSION THERAPY | Facility: HOSPITAL | Age: 89
End: 2024-09-13

## 2024-09-13 ENCOUNTER — APPOINTMENT (OUTPATIENT)
Dept: HEMATOLOGY ONCOLOGY | Facility: CLINIC | Age: 89
End: 2024-09-13
Payer: MEDICARE

## 2024-09-13 VITALS
RESPIRATION RATE: 16 BRPM | WEIGHT: 169.73 LBS | DIASTOLIC BLOOD PRESSURE: 60 MMHG | BODY MASS INDEX: 27.28 KG/M2 | TEMPERATURE: 98.6 F | OXYGEN SATURATION: 99 % | SYSTOLIC BLOOD PRESSURE: 104 MMHG | HEART RATE: 64 BPM

## 2024-09-13 DIAGNOSIS — C61 MALIGNANT NEOPLASM OF PROSTATE: ICD-10-CM

## 2024-09-13 LAB
BASOPHILS # BLD AUTO: 0.02 K/UL — SIGNIFICANT CHANGE UP (ref 0–0.2)
BASOPHILS NFR BLD AUTO: 0.3 % — SIGNIFICANT CHANGE UP (ref 0–2)
EOSINOPHIL # BLD AUTO: 0.32 K/UL — SIGNIFICANT CHANGE UP (ref 0–0.5)
EOSINOPHIL NFR BLD AUTO: 5.2 % — SIGNIFICANT CHANGE UP (ref 0–6)
HCT VFR BLD CALC: 35.5 % — LOW (ref 39–50)
HGB BLD-MCNC: 12.4 G/DL — LOW (ref 13–17)
IMM GRANULOCYTES NFR BLD AUTO: 0.2 % — SIGNIFICANT CHANGE UP (ref 0–0.9)
LYMPHOCYTES # BLD AUTO: 1.08 K/UL — SIGNIFICANT CHANGE UP (ref 1–3.3)
LYMPHOCYTES # BLD AUTO: 17.6 % — SIGNIFICANT CHANGE UP (ref 13–44)
MCHC RBC-ENTMCNC: 31.3 PG — SIGNIFICANT CHANGE UP (ref 27–34)
MCHC RBC-ENTMCNC: 34.9 G/DL — SIGNIFICANT CHANGE UP (ref 32–36)
MCV RBC AUTO: 89.6 FL — SIGNIFICANT CHANGE UP (ref 80–100)
MONOCYTES # BLD AUTO: 0.61 K/UL — SIGNIFICANT CHANGE UP (ref 0–0.9)
MONOCYTES NFR BLD AUTO: 9.9 % — SIGNIFICANT CHANGE UP (ref 2–14)
NEUTROPHILS # BLD AUTO: 4.11 K/UL — SIGNIFICANT CHANGE UP (ref 1.8–7.4)
NEUTROPHILS NFR BLD AUTO: 66.8 % — SIGNIFICANT CHANGE UP (ref 43–77)
NRBC # BLD: 0 /100 WBCS — SIGNIFICANT CHANGE UP (ref 0–0)
PLATELET # BLD AUTO: 210 K/UL — SIGNIFICANT CHANGE UP (ref 150–400)
RBC # BLD: 3.96 M/UL — LOW (ref 4.2–5.8)
RBC # FLD: 12.3 % — SIGNIFICANT CHANGE UP (ref 10.3–14.5)
WBC # BLD: 6.15 K/UL — SIGNIFICANT CHANGE UP (ref 3.8–10.5)
WBC # FLD AUTO: 6.15 K/UL — SIGNIFICANT CHANGE UP (ref 3.8–10.5)

## 2024-09-13 PROCEDURE — 99213 OFFICE O/P EST LOW 20 MIN: CPT

## 2024-09-13 PROCEDURE — G2211 COMPLEX E/M VISIT ADD ON: CPT

## 2024-09-13 NOTE — HISTORY OF PRESENT ILLNESS
[Disease: _____________________] : Disease: [unfilled] [de-identified] : John Latham is an 88 years old male with PMHx of CAD s/p CABG and prostate cancer. He had the initial medical oncology consultation on 11/22/23:  His PSA was trending up from below 1 to 3.0ng/ml. had a biopsy with rachel 7, one of 12 cores positive, and underwent 50 fractions XRT around 2012 ([facility] at [place]). Has been following, with now rising PSA level after zofia ~ 0.5 post XRT.  PSA Hx: 6.75- 11/10/23 4.69-- 5/17/23 4.08 -- 11/2022 2.78-- 5/2022 3.47-- 12/3/21 3.91-- 11/16/21 2.78- 4/29/21 2.71-- 11/2020 2.7---7/2020 2.3---2/2020 2.3--- 9/2019 2.1--- 2/2019 1.8--- 9/2018 1.3--- 7/2017 0.94-- 6/2016  Reports feeling great. Urine flow is normal. Nocturia 1. Denies hesitancy, urgency, and gross hematuria.      Interval History:  12/5/23 PSMA PET CT showed increased focal activity noted in the posterior lateral area of the right prostate with SUV 6 and another tiny activity is noted in the left apex with SUV 3.9, the right iliac bone showed a large lucency with low level tracer uptake with SUV 1.5. not any other lesions in the chest, abdomen and pelvis and bones.  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   [de-identified] : prostate adenocarcinoma  [de-identified] : 3/22/24: he feels well and denies fatigue, hot flashes, changes with urination.   6/14/24 feels overall fine, denies fatigue, hot flashes, and sweating. Nocturia 1.  9/13/24 reports feeling fine, denies fatigue, hot flashes and sweating. Nocturia 2

## 2024-09-13 NOTE — ASSESSMENT
[FreeTextEntry1] : Prostate cancer (185) (C61) 88 years old male who was diagnosed rachel 7 in one of 12 cores s/p 50 fractions of radiation at [facility] around 2012. His PSA has been rising to 6.75 in 11/2023 from 0.94 in 6/2016, 2.71 in 11/2020, and 4.69 in 5/2023.  During the consultation in 12/2023, reviewed his PSMA PET CT in detail. 12/5/23 PSMA PET CT showed increased focal activity noted in the posterior lateral area of the right prostate with SUV 6 and another tiny activity is noted in the left apex with SUV 3.9, the right iliac bone showed a large lucency with low level tracer uptake with SUV 1.5. not any other lesions in the chest, abdomen and pelvis and bones. His right iliac bone showed low activity. It is nonspecific. I will treat him with only ADT with low volume, He was discussed with the benefits and risks of ADT. He will require calcium and vitamin D. Based on the DEXA scan with osteoporosis, starts prolia on June 14, 2024   [facility] with low volume -started bicalutamide 50mg daily around 12/11/23; started lupron inj (once every 3 months) on 12/26/23. Will receive today 3/22/24. Next dose around Sept 2024.  -f/u with lab today -start prolia treatment once every 6 months on June 14, 2024  based on his DEXA scan 12/7/23 showing osteoporosis (right femoral neck T score -2.5) -continue vit D and calcium  RTC in 3 months.

## 2024-09-15 LAB
ALBUMIN SERPL ELPH-MCNC: 4.3 G/DL
ALP BLD-CCNC: 45 U/L
ALT SERPL-CCNC: 16 U/L
ANION GAP SERPL CALC-SCNC: 14 MMOL/L
AST SERPL-CCNC: 17 U/L
BILIRUB SERPL-MCNC: 0.4 MG/DL
BUN SERPL-MCNC: 25 MG/DL
CALCIUM SERPL-MCNC: 9.7 MG/DL
CHLORIDE SERPL-SCNC: 108 MMOL/L
CO2 SERPL-SCNC: 23 MMOL/L
CREAT SERPL-MCNC: 1.16 MG/DL
EGFR: 61 ML/MIN/1.73M2
GLUCOSE SERPL-MCNC: 92 MG/DL
POTASSIUM SERPL-SCNC: 4.5 MMOL/L
PROT SERPL-MCNC: 7 G/DL
PSA SERPL-MCNC: <0.01 NG/ML
SODIUM SERPL-SCNC: 144 MMOL/L

## 2024-10-23 ENCOUNTER — NON-APPOINTMENT (OUTPATIENT)
Age: 89
End: 2024-10-23

## 2024-10-23 ENCOUNTER — APPOINTMENT (OUTPATIENT)
Dept: UROLOGY | Facility: CLINIC | Age: 89
End: 2024-10-23
Payer: MEDICARE

## 2024-10-23 VITALS — SYSTOLIC BLOOD PRESSURE: 125 MMHG | DIASTOLIC BLOOD PRESSURE: 75 MMHG

## 2024-10-23 DIAGNOSIS — C61 MALIGNANT NEOPLASM OF PROSTATE: ICD-10-CM

## 2024-10-23 PROCEDURE — 99213 OFFICE O/P EST LOW 20 MIN: CPT

## 2024-12-04 ENCOUNTER — OUTPATIENT (OUTPATIENT)
Dept: OUTPATIENT SERVICES | Facility: HOSPITAL | Age: 89
LOS: 1 days | Discharge: HOME | End: 2024-12-04

## 2024-12-04 DIAGNOSIS — C61 MALIGNANT NEOPLASM OF PROSTATE: ICD-10-CM

## 2024-12-13 ENCOUNTER — RESULT REVIEW (OUTPATIENT)
Age: 89
End: 2024-12-13

## 2024-12-13 ENCOUNTER — APPOINTMENT (OUTPATIENT)
Dept: HEMATOLOGY ONCOLOGY | Facility: CLINIC | Age: 89
End: 2024-12-13
Payer: MEDICARE

## 2024-12-13 ENCOUNTER — APPOINTMENT (OUTPATIENT)
Dept: INFUSION THERAPY | Facility: HOSPITAL | Age: 89
End: 2024-12-13

## 2024-12-13 VITALS
DIASTOLIC BLOOD PRESSURE: 63 MMHG | BODY MASS INDEX: 27.21 KG/M2 | RESPIRATION RATE: 16 BRPM | WEIGHT: 169.29 LBS | HEART RATE: 61 BPM | SYSTOLIC BLOOD PRESSURE: 99 MMHG | OXYGEN SATURATION: 99 % | TEMPERATURE: 97.3 F

## 2024-12-13 DIAGNOSIS — C61 MALIGNANT NEOPLASM OF PROSTATE: ICD-10-CM

## 2024-12-13 LAB
BASOPHILS # BLD AUTO: 0.03 K/UL — SIGNIFICANT CHANGE UP (ref 0–0.2)
BASOPHILS NFR BLD AUTO: 0.4 % — SIGNIFICANT CHANGE UP (ref 0–2)
EOSINOPHIL # BLD AUTO: 0.36 K/UL — SIGNIFICANT CHANGE UP (ref 0–0.5)
EOSINOPHIL NFR BLD AUTO: 4.5 % — SIGNIFICANT CHANGE UP (ref 0–6)
HCT VFR BLD CALC: 36.7 % — LOW (ref 39–50)
HGB BLD-MCNC: 12.7 G/DL — LOW (ref 13–17)
IMM GRANULOCYTES NFR BLD AUTO: 0.2 % — SIGNIFICANT CHANGE UP (ref 0–0.9)
LYMPHOCYTES # BLD AUTO: 1.15 K/UL — SIGNIFICANT CHANGE UP (ref 1–3.3)
LYMPHOCYTES # BLD AUTO: 14.2 % — SIGNIFICANT CHANGE UP (ref 13–44)
MCHC RBC-ENTMCNC: 31.4 PG — SIGNIFICANT CHANGE UP (ref 27–34)
MCHC RBC-ENTMCNC: 34.6 G/DL — SIGNIFICANT CHANGE UP (ref 32–36)
MCV RBC AUTO: 90.8 FL — SIGNIFICANT CHANGE UP (ref 80–100)
MONOCYTES # BLD AUTO: 0.81 K/UL — SIGNIFICANT CHANGE UP (ref 0–0.9)
MONOCYTES NFR BLD AUTO: 10 % — SIGNIFICANT CHANGE UP (ref 2–14)
NEUTROPHILS # BLD AUTO: 5.71 K/UL — SIGNIFICANT CHANGE UP (ref 1.8–7.4)
NEUTROPHILS NFR BLD AUTO: 70.7 % — SIGNIFICANT CHANGE UP (ref 43–77)
NRBC # BLD: 0 /100 WBCS — SIGNIFICANT CHANGE UP (ref 0–0)
NRBC BLD-RTO: 0 /100 WBCS — SIGNIFICANT CHANGE UP (ref 0–0)
PLATELET # BLD AUTO: 209 K/UL — SIGNIFICANT CHANGE UP (ref 150–400)
RBC # BLD: 4.04 M/UL — LOW (ref 4.2–5.8)
RBC # FLD: 12.1 % — SIGNIFICANT CHANGE UP (ref 10.3–14.5)
WBC # BLD: 8.08 K/UL — SIGNIFICANT CHANGE UP (ref 3.8–10.5)
WBC # FLD AUTO: 8.08 K/UL — SIGNIFICANT CHANGE UP (ref 3.8–10.5)

## 2024-12-13 PROCEDURE — G2211 COMPLEX E/M VISIT ADD ON: CPT

## 2024-12-13 PROCEDURE — 99214 OFFICE O/P EST MOD 30 MIN: CPT

## 2024-12-16 DIAGNOSIS — M81.0 AGE-RELATED OSTEOPOROSIS WITHOUT CURRENT PATHOLOGICAL FRACTURE: ICD-10-CM

## 2024-12-16 LAB
ALBUMIN SERPL ELPH-MCNC: 4.7 G/DL
ALP BLD-CCNC: 68 U/L
ALT SERPL-CCNC: 16 U/L
ANION GAP SERPL CALC-SCNC: 16 MMOL/L
AST SERPL-CCNC: 20 U/L
BILIRUB SERPL-MCNC: 0.5 MG/DL
BUN SERPL-MCNC: 22 MG/DL
CALCIUM SERPL-MCNC: 10 MG/DL
CHLORIDE SERPL-SCNC: 101 MMOL/L
CO2 SERPL-SCNC: 24 MMOL/L
CREAT SERPL-MCNC: 1.1 MG/DL
EGFR: 65 ML/MIN/1.73M2
GLUCOSE SERPL-MCNC: 113 MG/DL
POTASSIUM SERPL-SCNC: 4.4 MMOL/L
PROT SERPL-MCNC: 7.8 G/DL
PSA SERPL-MCNC: <0.01 NG/ML
SODIUM SERPL-SCNC: 141 MMOL/L

## 2024-12-25 PROBLEM — F10.90 ALCOHOL USE: Status: ACTIVE | Noted: 2019-12-06

## 2025-03-13 ENCOUNTER — OUTPATIENT (OUTPATIENT)
Dept: OUTPATIENT SERVICES | Facility: HOSPITAL | Age: 89
LOS: 1 days | Discharge: HOME | End: 2025-03-13

## 2025-03-13 DIAGNOSIS — C61 MALIGNANT NEOPLASM OF PROSTATE: ICD-10-CM

## 2025-03-28 ENCOUNTER — APPOINTMENT (OUTPATIENT)
Dept: HEMATOLOGY ONCOLOGY | Facility: CLINIC | Age: 89
End: 2025-03-28
Payer: MEDICARE

## 2025-03-28 ENCOUNTER — APPOINTMENT (OUTPATIENT)
Dept: INFUSION THERAPY | Facility: HOSPITAL | Age: 89
End: 2025-03-28

## 2025-03-28 VITALS
HEART RATE: 59 BPM | TEMPERATURE: 97.4 F | BODY MASS INDEX: 27 KG/M2 | RESPIRATION RATE: 16 BRPM | SYSTOLIC BLOOD PRESSURE: 104 MMHG | OXYGEN SATURATION: 98 % | DIASTOLIC BLOOD PRESSURE: 61 MMHG | WEIGHT: 167.99 LBS

## 2025-03-28 DIAGNOSIS — C61 MALIGNANT NEOPLASM OF PROSTATE: ICD-10-CM

## 2025-03-28 PROCEDURE — 99214 OFFICE O/P EST MOD 30 MIN: CPT

## 2025-03-31 DIAGNOSIS — Z51.11 ENCOUNTER FOR ANTINEOPLASTIC CHEMOTHERAPY: ICD-10-CM

## 2025-04-30 ENCOUNTER — RX RENEWAL (OUTPATIENT)
Age: 89
End: 2025-04-30

## 2025-06-11 ENCOUNTER — OUTPATIENT (OUTPATIENT)
Dept: OUTPATIENT SERVICES | Facility: HOSPITAL | Age: 89
LOS: 1 days | Discharge: HOME | End: 2025-06-11

## 2025-06-11 DIAGNOSIS — C61 MALIGNANT NEOPLASM OF PROSTATE: ICD-10-CM

## 2025-07-23 ENCOUNTER — APPOINTMENT (OUTPATIENT)
Dept: INFUSION THERAPY | Facility: HOSPITAL | Age: 89
End: 2025-07-23

## 2025-07-23 ENCOUNTER — APPOINTMENT (OUTPATIENT)
Dept: HEMATOLOGY ONCOLOGY | Facility: CLINIC | Age: 89
End: 2025-07-23
Payer: MEDICARE

## 2025-07-23 VITALS
TEMPERATURE: 97.2 F | HEIGHT: 65.35 IN | SYSTOLIC BLOOD PRESSURE: 128 MMHG | DIASTOLIC BLOOD PRESSURE: 78 MMHG | HEART RATE: 59 BPM | RESPIRATION RATE: 16 BRPM | WEIGHT: 165.56 LBS | OXYGEN SATURATION: 98 % | BODY MASS INDEX: 27.25 KG/M2

## 2025-07-23 LAB
ALBUMIN SERPL ELPH-MCNC: 4.3 G/DL
ALP BLD-CCNC: 73 U/L
ALT SERPL-CCNC: 17 U/L
ANION GAP SERPL CALC-SCNC: 14 MMOL/L
AST SERPL-CCNC: 20 U/L
BASOPHILS # BLD AUTO: 0.03 K/UL
BASOPHILS NFR BLD AUTO: 0.4 %
BILIRUB SERPL-MCNC: 0.4 MG/DL
BUN SERPL-MCNC: 22 MG/DL
CALCIUM SERPL-MCNC: 10.1 MG/DL
CHLORIDE SERPL-SCNC: 104 MMOL/L
CO2 SERPL-SCNC: 23 MMOL/L
CREAT SERPL-MCNC: 0.93 MG/DL
EGFRCR SERPLBLD CKD-EPI 2021: 79 ML/MIN/1.73M2
EOSINOPHIL # BLD AUTO: 0.31 K/UL
EOSINOPHIL NFR BLD AUTO: 4.2 %
GLUCOSE SERPL-MCNC: 103 MG/DL
HCT VFR BLD CALC: 35.5 %
HGB BLD-MCNC: 11.8 G/DL
IMM GRANULOCYTES NFR BLD AUTO: 0.3 %
LYMPHOCYTES # BLD AUTO: 1.38 K/UL
LYMPHOCYTES NFR BLD AUTO: 18.7 %
MAN DIFF?: NORMAL
MCHC RBC-ENTMCNC: 29.9 PG
MCHC RBC-ENTMCNC: 33.2 G/DL
MCV RBC AUTO: 89.9 FL
MONOCYTES # BLD AUTO: 0.59 K/UL
MONOCYTES NFR BLD AUTO: 8 %
NEUTROPHILS # BLD AUTO: 5.05 K/UL
NEUTROPHILS NFR BLD AUTO: 68.4 %
PLATELET # BLD AUTO: 222 K/UL
POTASSIUM SERPL-SCNC: 4.4 MMOL/L
PROT SERPL-MCNC: 7.8 G/DL
RBC # BLD: 3.95 M/UL
RBC # FLD: 12.5 %
SODIUM SERPL-SCNC: 141 MMOL/L
WBC # FLD AUTO: 7.38 K/UL

## 2025-07-23 PROCEDURE — 99213 OFFICE O/P EST LOW 20 MIN: CPT

## 2025-07-24 DIAGNOSIS — M81.0 AGE-RELATED OSTEOPOROSIS WITHOUT CURRENT PATHOLOGICAL FRACTURE: ICD-10-CM

## 2025-07-24 LAB — PSA SERPL-MCNC: <0.01 NG/ML

## 2025-08-06 ENCOUNTER — APPOINTMENT (OUTPATIENT)
Dept: UROLOGY | Facility: CLINIC | Age: 89
End: 2025-08-06
Payer: MEDICARE

## 2025-08-06 VITALS
HEART RATE: 57 BPM | DIASTOLIC BLOOD PRESSURE: 63 MMHG | RESPIRATION RATE: 16 BRPM | SYSTOLIC BLOOD PRESSURE: 118 MMHG | OXYGEN SATURATION: 99 %

## 2025-08-06 DIAGNOSIS — C61 MALIGNANT NEOPLASM OF PROSTATE: ICD-10-CM

## 2025-08-06 DIAGNOSIS — R39.15 URGENCY OF URINATION: ICD-10-CM

## 2025-08-06 PROCEDURE — 99213 OFFICE O/P EST LOW 20 MIN: CPT

## 2025-08-08 DIAGNOSIS — R82.81 PYURIA: ICD-10-CM

## 2025-08-08 LAB
APPEARANCE: CLEAR
BACTERIA UR CULT: NORMAL
BACTERIA: NEGATIVE /HPF
BILIRUBIN URINE: NEGATIVE
BLOOD URINE: NEGATIVE
CAST: 2 /LPF
COLOR: YELLOW
EPITHELIAL CELLS: 2 /HPF
GLUCOSE QUALITATIVE U: NEGATIVE MG/DL
KETONES URINE: NEGATIVE MG/DL
LEUKOCYTE ESTERASE URINE: ABNORMAL
MICROSCOPIC-UA: NORMAL
NITRITE URINE: NEGATIVE
PH URINE: 6
PROTEIN URINE: NEGATIVE MG/DL
RED BLOOD CELLS URINE: 3 /HPF
SPECIFIC GRAVITY URINE: 1.02
UROBILINOGEN URINE: 0.2 MG/DL
WHITE BLOOD CELLS URINE: 75 /HPF

## 2025-08-13 ENCOUNTER — NON-APPOINTMENT (OUTPATIENT)
Age: 89
End: 2025-08-13